# Patient Record
Sex: FEMALE | Race: BLACK OR AFRICAN AMERICAN | NOT HISPANIC OR LATINO | Employment: FULL TIME | URBAN - METROPOLITAN AREA
[De-identification: names, ages, dates, MRNs, and addresses within clinical notes are randomized per-mention and may not be internally consistent; named-entity substitution may affect disease eponyms.]

---

## 2022-10-26 ENCOUNTER — HOSPITAL ENCOUNTER (EMERGENCY)
Facility: OTHER | Age: 57
Discharge: HOME | End: 2022-10-26
Attending: EMERGENCY MEDICINE
Payer: MEDICAID

## 2022-10-26 VITALS
HEIGHT: 68 IN | HEART RATE: 94 BPM | BODY MASS INDEX: 23.04 KG/M2 | SYSTOLIC BLOOD PRESSURE: 119 MMHG | TEMPERATURE: 98 F | WEIGHT: 152 LBS | OXYGEN SATURATION: 100 % | DIASTOLIC BLOOD PRESSURE: 66 MMHG | RESPIRATION RATE: 18 BRPM

## 2022-10-26 DIAGNOSIS — M77.9 BONE SPUR: Primary | ICD-10-CM

## 2022-10-26 DIAGNOSIS — T14.90XA INJURY: ICD-10-CM

## 2022-10-26 PROCEDURE — 99284 EMERGENCY DEPT VISIT MOD MDM: CPT

## 2022-10-26 NOTE — ED NOTES
LOC: The patient is awake, alert, and oriented to self, place, time, and situation. Pt is calm and cooperative. Affect is appropriate.  Speech is appropriate and clear.     APPEARANCE: Patient sitting in chair in no acute distress.  Patient is clean and well groomed.    SKIN: The skin is warm and dry; color consistent with ethnicity.  Patient has normal skin turgor and moist mucus membranes.  Skin intact; no breakdown or bruising noted.     MUSCULOSKELETAL: Patient moving upper and lower extremities without difficulty but complain in the left heel, ankle, and 2nd toe; denies pain in the back.  Denies weakness.     RESPIRATORY: Airway is open and patent. Respirations spontaneous, even, easy, and non-labored.  Patient has a normal effort and rate.  No accessory muscle use noted. Denies cough.     CARDIAC:  Normal rate noted.  No peripheral edema noted. No complaints of chest pain.      ABDOMEN: Soft and non tender to palpation.  No distention noted. Pt denies abdominal pain; denies nausea, vomiting, diarrhea, or constipation.    NEUROLOGIC: Eyes open spontaneously.  Behavior appropriate to situation.  Follows commands; facial expression symmetrical.  Purposeful motor response noted; normal sensation in all extremities. Pt denies headache; denies lightheadedness or dizziness; denies visual disturbances; denies loss of balance; denies unilateral weakness.

## 2022-10-26 NOTE — DISCHARGE INSTRUCTIONS
Soak in epsom salt twice a day to help with pain to foot  Tylenol 500mg every 4 hours or 1000mg every 6 hours

## 2022-10-26 NOTE — FIRST PROVIDER EVALUATION
Emergency Department TeleTriage Encounter Note      CHIEF COMPLAINT    Chief Complaint   Patient presents with    Toe Pain     Pt presents to the ER with complaints of pain in the left 2nd tod, left heel, and left ankle after slipping and falling (landing on her butt) yesterday. Pt denies hitting her head; states she did not immediately experience pain; states pain started after working on her feet all day yesterday.         VITAL SIGNS   Initial Vitals [10/26/22 1250]   BP Pulse Resp Temp SpO2   119/66 94 18 97.8 °F (36.6 °C) 100 %      MAP       --            ALLERGIES    Review of patient's allergies indicates:  No Known Allergies    PROVIDER TRIAGE NOTE  This is a teletriage evaluation of a 57 y.o. female presenting to the ED with c/o slip and fall.  C/O left foot and ankle pain. Limited physical exam via telehealth: The patient is awake, alert, answering questions appropriately and is not in respiratory distress. Initial orders will be placed and care will be transferred to an alternate provider when patient is roomed for a full evaluation. Any additional orders and the final disposition will be determined by that provider.     ORDERS  Labs Reviewed - No data to display    ED Orders (720h ago, onward)      Start Ordered     Status Ordering Provider    10/26/22 1259 10/26/22 1258  POCT urine pregnancy  Once         Ordered ARMANI OLEA    10/26/22 1259 10/26/22 1258  X-Ray Foot Complete Left  1 time imaging         Ordered ARMANI OLEA    10/26/22 1259 10/26/22 1258  X-Ray Ankle Complete Left  1 time imaging         Ordered ARMANI OLEA              Virtual Visit Note: The provider triage portion of this emergency department evaluation and documentation was performed via [employer], a HIPAA-compliant telemedicine application, in concert with a tele-presenter in the room. A face to face patient evaluation with one of my colleagues will occur once the patient is placed in an emergency department  room.      DISCLAIMER: This note was prepared with [employer] voice recognition transcription software. Garbled syntax, mangled pronouns, and other bizarre constructions may be attributed to that software system.

## 2022-10-26 NOTE — ED PROVIDER NOTES
Encounter Date: 10/26/2022       History     Chief Complaint   Patient presents with    Toe Pain     Pt presents to the ER with complaints of pain in the left 2nd tod, left heel, and left ankle after slipping and falling (landing on her butt) yesterday. Pt denies hitting her head; states she did not immediately experience pain; states pain started after working on her feet all day yesterday.       56 y/o female with HTN which presents to the ED with left 2nd toe pain after slipping yesterday. Pt denies any other symptoms. She is able to ambulate but it is painful.    The history is provided by the patient.   Review of patient's allergies indicates:  No Known Allergies  Past Medical History:   Diagnosis Date    Hypertension      History reviewed. No pertinent surgical history.  History reviewed. No pertinent family history.  Social History     Tobacco Use    Smoking status: Every Day     Packs/day: 0.25     Types: Cigarettes   Substance Use Topics    Alcohol use: Yes     Comment: occasionally    Drug use: No     Review of Systems   Constitutional:  Negative for fever.   HENT:  Negative for sore throat.    Respiratory:  Negative for shortness of breath.    Cardiovascular:  Negative for chest pain.   Gastrointestinal:  Negative for nausea.   Genitourinary:  Negative for dysuria.   Musculoskeletal:  Positive for arthralgias, joint swelling and myalgias. Negative for back pain.   Skin:  Negative for rash.   Neurological:  Negative for weakness.   Hematological:  Does not bruise/bleed easily.   All other systems reviewed and are negative.    Physical Exam     Initial Vitals [10/26/22 1250]   BP Pulse Resp Temp SpO2   119/66 94 18 97.8 °F (36.6 °C) 100 %      MAP       --         Physical Exam    Nursing note and vitals reviewed.  Constitutional: She appears well-developed and well-nourished.   HENT:   Head: Normocephalic and atraumatic.   Eyes: Conjunctivae and EOM are normal. Pupils are equal, round, and reactive to  light.   Neck:   Normal range of motion.  Cardiovascular:  Normal rate, regular rhythm, normal heart sounds and intact distal pulses.     Exam reveals no gallop and no friction rub.       No murmur heard.  Pulmonary/Chest: Breath sounds normal. No respiratory distress. She has no wheezes. She has no rhonchi. She has no rales. She exhibits no tenderness.   Musculoskeletal:         General: Tenderness and edema present.      Cervical back: Normal range of motion.        Feet:      Neurological: She is alert and oriented to person, place, and time. She has normal strength. GCS score is 15. GCS eye subscore is 4. GCS verbal subscore is 5. GCS motor subscore is 6.   Skin: Skin is warm. Capillary refill takes less than 2 seconds. No erythema.     ED Course   Procedures  Labs Reviewed   POCT URINE PREGNANCY          Imaging Results              X-Ray Foot Complete Left (Final result)  Result time 10/26/22 13:38:31      Final result by Nevilel Fernandez MD (10/26/22 13:38:31)                   Impression:      As above      Electronically signed by: Neville Fernandez  Date:    10/26/2022  Time:    13:38               Narrative:    EXAMINATION:  XR FOOT COMPLETE 3 VIEW LEFT    CLINICAL HISTORY:  .  Injury, unspecified, initial encounter    TECHNIQUE:  AP, lateral and oblique views of the left foot were performed.    COMPARISON:  None    FINDINGS:  No acute fractures.  Preserved bone density.  Minimal spurring about the preserved 1st MTP articulation.  Elsewhere, preserved joint spaces.  Tiny spurs developing at plantar fashion Achilles tendon attachments.  Soft tissue swelling dorsally at the level of the metatarsals and MTP articulations.                                       X-Ray Ankle Complete Left (Final result)  Result time 10/26/22 13:39:24      Final result by Neville Fernandez MD (10/26/22 13:39:24)                   Impression:      As above.      Electronically signed by: Neville Fernandez  Date:    10/26/2022  Time:    13:39                Narrative:    EXAMINATION:  XR ANKLE COMPLETE 3 VIEW LEFT    CLINICAL HISTORY:  Injury, unspecified, initial encounter    TECHNIQUE:  AP, lateral and oblique views of the left ankle were performed.    COMPARISON:  None    FINDINGS:  No fracture.  No malalignment.  Preserved tibiotalar articulation and talar dome.  Mild bimalleolar soft tissue swelling.  Tiny calcaneal spurs developing at the are Achilles tendon and plantar aponeurosis attachments.                                       Medications - No data to display  Medical Decision Making:   Initial Assessment:   56 y/o female which presents to the ED with left 2nd toe pain after slipping yesterday.  Differential Diagnosis:   Toe sprain, fracture, contusion  Clinical Tests:   Radiological Study: Ordered and Reviewed  ED Management:  Pt examined and has swelling and bruising to the left 2nd MIP region. Xrays negative. Pt advised to take tylenol and to soak the foot in epsom salt to help with swelling. Patient given strict return precautions and voiced understanding of all discharge instructions. Pt was stable at discharge.                  ED Course as of 10/26/22 1455   Wed Oct 26, 2022   1412 BP: 119/66 [AT]   1413 Temp: 97.8 °F (36.6 °C) [AT]   1413 Temp src: Oral [AT]   1413 Pulse: 94 [AT]   1413 Resp: 18 [AT]   1413 SpO2: 100 % [AT]      ED Course User Index  [AT] JANY Frazier                 Clinical Impression:   Final diagnoses:  [T14.90XA] Injury  [M77.9] Bone spur (Primary)        ED Disposition Condition    Discharge Stable          ED Prescriptions    None       Follow-up Information       Follow up With Specialties Details Why Contact Info    primary care provider as needed                 JANY Frazier  10/26/22 [id number]

## 2022-10-26 NOTE — ED TRIAGE NOTES
Pt presents to the ER with complaints of pain in the left 2nd tod, left heel, and left ankle after slipping and falling (landing on her butt) yesterday. Pt denies hitting her head; states she did not immediately experience pain; states pain started after working on her feet all day yesterday.